# Patient Record
(demographics unavailable — no encounter records)

---

## 2024-10-11 NOTE — PHYSICAL EXAM
[None] : none [Cooperative] : cooperative [Depressed] : depressed [Anxious] : anxious [Flat] : flat [Clear] : clear [Linear/Goal Directed] : linear/goal directed [Average] : average [WNL] : within normal limits [de-identified] : dysphoric

## 2024-10-11 NOTE — HISTORY OF PRESENT ILLNESS
[FreeTextEntry1] :   The following service was provided using telehealth between writer/provider and patient. The patient was at home. The writer was at clinic. Patient was alone and consented to telehealth format. All treatment plans through and including today's date were reviewed with the patient.  Patient is here for 1 month follow-up visit via telehealth.  States she is cross tapering from Zoloft to Pristiq 25 mg.   Mood: Increase anxiety is there but states she is doing better than she was before Sleep: 5-6 hours broken. Wakes up multiple times a night.  Appetite: is having hunger pains even when she is eating. When she is eating does not feels she wants to eat. So she is eating less. Half of what ever she si eating .  Energy: getting better Concentration: getting little better    Motivation: getting little better Denies any AVH, SI or HI. Last marijuana: 2 weeks ago.  Nicotine: Smoking 6 cigarettes per day. Not ready to quit.  Patient states she started psychotherapy. States she got an appt on [facility] on [facility]. States she got into their PTSD. First intake is on is on Tuesday 10/15 [FreeTextEntry2] : H/O: depression, anxiety, PTSD, and panic attacks.  Inpatient hospitalization: denies  Past SI: Cutting started ta age 17. Last cutting was at age 21 Therapist: Palak Culver  Psychiatrist: [facility]  Mukul Gillis  Medication trials: Trintellex, Rexulti, Klonopin, Valium. Been on it for 2 year. Has not been on it for 1 year. States all those medications helped her.  Current medications: denies Firearms: denies

## 2024-10-11 NOTE — PLAN
[FreeTextEntry4] : Assessment: Patient is a 43 yo female with h/o depression, anxiety, and PTSD seen today for medication management. Patient is compliant with the medications, tolerating it well without any side effects. I-STOP was checked without any problems.  I-STOP: Patient Demographic Information (PDI)     PDI	First Name	Last Name	Birth Date	Gender	Street Address	[facility]	Zip Code [place]	02/18/1979	Female	[address]	[id number]  Prescription Information    PDI Filter:   PDI	My Rx	Current Rx	Drug Type	Rx Written	Rx Dispensed	Drug	Quantity	Days Supply	Prescriber Name	Prescriber XUAN #	Payment Method	Dispenser A	N	N	O	09/22/2023	09/22/2023	oxycodone-acetaminophen 5-325 mg tab	21	7	Caterina Holliday	[id number]	Insurance	[id number] A	N	N	O	08/24/2023	08/25/2023	oxycodone-acetaminophen 5-325 mg tab	21	7	Caterina Holliday	[id number]	Insurance	[id number] A	N	N	O	08/16/2023	08/16/2023	oxycodone-acetaminophen 5-325 mg tab	21	7	Caterina Holliday	[id number]	Insurance	[id number] A	N	N	O	07/27/2023	07/28/2023	oxycodone-acetaminophen 5-325 mg tab	21	7	Caterina Holliady	[id number]	Insurance	[id number] A	N	N	O	07/06/2023	07/06/2023	oxycodone-acetaminophen 5-325 mg tab	45	15	Deejay Sofia	[id number]	Insurance	[id number] A	N	N		07/03/2023	07/03/2023	pregabalin 50 mg capsule	60	30	Deejay Sofia	[id number]	Insurance	[id number] A	N	N	O	06/26/2023	06/26/2023	oxycodone-acetaminophen 5-325 mg tab	21	7	Diamond Sims	[id number]	Insurance	[id number] A	N	N	O	06/21/2023	06/22/2023	buprenorphine 10 mcg/hr patch	4	28	Andrez Tobar MD	[id number]	Insurance	[id number]  PLAN: Signed her FMLA continuous from 9/24-11/4 D/C Zoloft 75  Increase Pristiq 25 to 50  mg PO QD for depression and anxiety Start Hydroxyzine 10 mg PO QHS for anxiety and insomnia Continue Klonopin 0.75 mg PO PRN #45 for anxiety D/C Mirtazapine 7.5 mg PO QHS for insomnia D/C Cymbalta 120 mg PO QHS for depression, anxiety and neuropathic pain.  D/C Vistaril 25 mg PO TID PRN for anxiety, insomnia and allergic reaction.  D/C Zoloft 25 mg x 5 days and then increase it to 50 mg PO QD for depression and anxiety D/C Trintellix 10 mg PO QD for depression and anxiety. D/C Lexapro 10 mg PO QAM for depression and anxiety D/C Trazodone 50 mg PO QHS for insomnia - Discussed risks and benefits of medications including side effects of GI and sexual with SSRI. Alternative strategies including no intervention discussed with patient. Patient consents to current medications as prescribed. - Discussed with patient regarding importance of abstinence and sobriety from alcohol and drugs. Educated about relationship between worsening mood/anxiety symptoms and drug use and improvement of symptoms with abstinence.  - Discussed about unpredictable effects including cardiorespiratory collapse from the combination of illicit drugs and prescribed medications. Patient verbalized understanding. - Patient understands to contact clinic prn with concerns and agrees to call 911 or go to nearest ER if symptoms worsen. - Next appointment was made by the patient in 6-8 weeks Patient was not in any distress.

## 2024-11-05 NOTE — PLAN
[FreeTextEntry4] : Assessment: Patient is a 43 yo female with h/o depression, anxiety, and PTSD seen today for medication management. Patient is compliant with the medications, tolerating it well without any side effects. I-STOP was checked without any problems.  I-STOP: Patient Demographic Information (PDI)     PDI	First Name	Last Name	Birth Date	Gender	Street Address	[facility]	Zip Code [place]	02/18/1979	Female	[address] [facility]	[address]  Prescription Information    PDI Filter:   PDI	My Rx	Current Rx	Drug Type	Rx Written	Rx Dispensed	Drug	Quantity	Days Supply	Prescriber Name	Prescriber XUAN #	Payment Method	Dispenser A	N	N	O	09/22/2023	09/22/2023	oxycodone-acetaminophen 5-325 mg tab	21	7	Caterina Holliday	[id number]	Insurance	[id number] A	N	N	O	08/24/2023	08/25/2023	oxycodone-acetaminophen 5-325 mg tab	21	7	Caterina Holliday	[id number]	Insurance	[id number] A	N	N	O	08/16/2023	08/16/2023	oxycodone-acetaminophen 5-325 mg tab	21	7	Caterina Holliday	[id number]	Insurance	[id number] A	N	N	O	07/27/2023	07/28/2023	oxycodone-acetaminophen 5-325 mg tab	21	7	Caterina Holliday	[id number]	Insurance	[id number] A	N	N	O	07/06/2023	07/06/2023	oxycodone-acetaminophen 5-325 mg tab	45	15	Deejay Sofia	[id number]	Insurance	[id number] A	N	N		07/03/2023	07/03/2023	pregabalin 50 mg capsule	60	30	Deejay Sofia	[id number]	Insurance	[id number] A	N	N	O	06/26/2023	06/26/2023	oxycodone-acetaminophen 5-325 mg tab	21	7	Diamond Sims	[id number]	Insurance	[id number] A	N	N	O	06/21/2023	06/22/2023	buprenorphine 10 mcg/hr patch	4	28	Andrez Tobar MD	[id number]	Insurance	[id number]  PLAN: Signed her FMLA continuous from 9/24-11/4 D/C Zoloft 75  Increase Pristiq 50 to 75 mg PO QD for depression and anxiety D/C Hydroxyzine 10 mg PO QHS for anxiety and insomnia Change Klonopin 0.75 mg to Lorazepam 1.5 mg PO QD PRN for anxiety #45 Re-start Mirtazapine 7.5 mg PO QHS for insomnia D/C Cymbalta 120 mg PO QHS for depression, anxiety and neuropathic pain.  D/C Vistaril 25 mg PO TID PRN for anxiety, insomnia and allergic reaction.  D/C Zoloft 25 mg x 5 days and then increase it to 50 mg PO QD for depression and anxiety D/C Trintellix 10 mg PO QD for depression and anxiety. D/C Lexapro 10 mg PO QAM for depression and anxiety D/C Trazodone 50 mg PO QHS for insomnia - Discussed risks and benefits of medications including side effects of GI and sexual with SSRI. Alternative strategies including no intervention discussed with patient. Patient consents to current medications as prescribed. - Discussed with patient regarding importance of abstinence and sobriety from alcohol and drugs. Educated about relationship between worsening mood/anxiety symptoms and drug use and improvement of symptoms with abstinence.  - Discussed about unpredictable effects including cardiorespiratory collapse from the combination of illicit drugs and prescribed medications. Patient verbalized understanding. - Patient understands to contact clinic prn with concerns and agrees to call 911 or go to nearest ER if symptoms worsen. - Next appointment was made by the patient in 6-8 weeks Patient was not in any distress.

## 2024-11-05 NOTE — HISTORY OF PRESENT ILLNESS
[FreeTextEntry1] : The following service was provided using telehealth between writer/provider and patient. The patient was at home. The writer was at clinic. Patient was alone and consented to telehealth format. All treatment plans through and including today's date were reviewed with the patient.  Patient is here for 1 month follow-up visit via telehealth.  Last month Pristiq was increased from 25 to 50 mg. States it helped for a couple of days in the beginning but then it stopped. Increase anxiety and depressed. Last month also Hydroxyzine 10 mg was started on the patient but states that didn't help her sleep either.  States she tried a Mirtazapine 7.5 mg which did help her sleep.    Mood: anxious and depressed. Went back to work yesterday 11/4.  Sleep: With Mirtazapine went to sleep at 8:30 pm and woke up at 6am.  Appetite: is better  Energy: getting little better.  Concentration: getting little better    Motivation: getting little better Denies any AVH, SI or HI. Last marijuana: smokes 2 times a week.  Nicotine: Smoking 6-7 cigarettes per day. Not ready to quit.  Patient states she started psychotherapy. States she got an appt on [facility] help on [place]. States she got into their PTSD program.  [FreeTextEntry2] : H/O: depression, anxiety, PTSD, and panic attacks.  Inpatient hospitalization: denies  Past SI: Cutting started ta age 17. Last cutting was at age 21 Therapist: Palak Culver  Psychiatrist: [facility]  Mukul Gillis  Medication trials: Trintellex, Rexulti, Klonopin, Valium. Been on it for 2 year. Has not been on it for 1 year. States all those medications helped her.  Current medications: denies Firearms: denies

## 2024-11-05 NOTE — PHYSICAL EXAM
[None] : none [Cooperative] : cooperative [Depressed] : depressed [Anxious] : anxious [Flat] : flat [Clear] : clear [Linear/Goal Directed] : linear/goal directed [Average] : average [WNL] : within normal limits [de-identified] : dysphoric

## 2024-11-18 NOTE — PHYSICAL EXAM
[None] : none [Cooperative] : cooperative [Depressed] : depressed [Anxious] : anxious [Flat] : flat [Clear] : clear [Linear/Goal Directed] : linear/goal directed [Average] : average [WNL] : within normal limits [de-identified] : dysphoric

## 2024-11-18 NOTE — HISTORY OF PRESENT ILLNESS
[FreeTextEntry1] : The following service was provided using telehealth between writer/provider and patient. The patient was at home. The writer was at clinic. Patient was alone and consented to telehealth format. All treatment plans through and including today's date were reviewed with the patient.  Patient is here for 2 weeks ago follow-up visit via telehealth.  Last seen on Nov5, 2024. At that time Pristiq was increased from 50 to 75 mg. States it is making me shake. Mirtazapine 7.5 mg was also given and states that didn't help her sleep at all. In the past she was on Trintellex and Rexulti combo which helped the most. She didn't even use the Klonopin at that time all that often.     Mood: anxious and depressed. Went back to work yesterday 11/4.  Sleep: 4-5 hours per night broken.  Appetite: is better  Energy: decreased Concentration: decreased    Motivation: decreased Denies any AVH, SI or HI. Last marijuana: over the weekend  Nicotine: Smoking has increased from 6-7 cigarettes per day to 1/2 PPD. Not ready to quit.  Patient states she started psychotherapy. Has only had 1 session with her.

## 2024-11-18 NOTE — PLAN
[FreeTextEntry4] : Assessment: Patient is a 45 yo female with h/o depression, anxiety, and PTSD seen today for medication management. Patient is compliant with the medications, tolerating it well without any side effects. I-STOP was checked without any problems.  I-STOP: Patient Demographic Information (PDI)     PDI	First Name	Last Name	Birth Date	Gender	Street Address	[facility]	Zip Code [place]	02/18/1979	Female	[address]	[address]  Prescription Information    PDI Filter:   PDI	My Rx	Current Rx	Drug Type	Rx Written	Rx Dispensed	Drug	Quantity	Days Supply	Prescriber Name	Prescriber XUAN #	Payment Method	Dispenser A	N	N	O	09/22/2023	09/22/2023	oxycodone-acetaminophen 5-325 mg tab	21	7	Caterina Holliday	[id number]	Insurance	[id number] A	N	N	O	08/24/2023	08/25/2023	oxycodone-acetaminophen 5-325 mg tab	21	7	Caterina Holliday	[id number]	Insurance	[id number] A	N	N	O	08/16/2023	08/16/2023	oxycodone-acetaminophen 5-325 mg tab	21	7	Caterina Holliday	[id number]	Insurance	[id number] A	N	N	O	07/27/2023	07/28/2023	oxycodone-acetaminophen 5-325 mg tab	21	7	Caterina Holliday	[id number]	Insurance	[id number] A	N	N	O	07/06/2023	07/06/2023	oxycodone-acetaminophen 5-325 mg tab	45	15	Deejay Sofia	[id number]	Insurance	[id number] A	N	N		07/03/2023	07/03/2023	pregabalin 50 mg capsule	60	30	Deejay Sofia	[id number]	Insurance	[id number] A	N	N	O	06/26/2023	06/26/2023	oxycodone-acetaminophen 5-325 mg tab	21	7	Diamond Sims	[id number]	Insurance	[id number] A	N	N	O	06/21/2023	06/22/2023	buprenorphine 10 mcg/hr patch	4	28	Andrez Tobar MD	[id number]	Insurance	[id number]  PLAN: Signed her FMLA continuous from 9/24-12/2/2024 Start Trintellix 10 mg PO QD for depression and anxiety Restart the Klonopin 0.75 mg PO PRN for anxiety #45 D/C Zoloft 75  D/C Pristiq PO QD for depression and anxiety D/C Hydroxyzine 10 mg PO QHS for anxiety and insomnia D/C Mirtazapine 7.5 mg PO QHS for insomnia D/C Cymbalta 120 mg PO QHS for depression, anxiety and neuropathic pain.  D/C Vistaril 25 mg PO TID PRN for anxiety, insomnia and allergic reaction.  D/C Zoloft 25 mg x 5 days and then increase it to 50 mg PO QD for depression and anxiety D/C Lexapro 10 mg PO QAM for depression and anxiety D/C Trazodone 50 mg PO QHS for insomnia - Discussed risks and benefits of medications including side effects of GI and sexual with SSRI. Alternative strategies including no intervention discussed with patient. Patient consents to current medications as prescribed. - Discussed with patient regarding importance of abstinence and sobriety from alcohol and drugs. Educated about relationship between worsening mood/anxiety symptoms and drug use and improvement of symptoms with abstinence.  - Discussed about unpredictable effects including cardiorespiratory collapse from the combination of illicit drugs and prescribed medications. Patient verbalized understanding. - Patient understands to contact clinic prn with concerns and agrees to call 911 or go to nearest ER if symptoms worsen. - Next appointment was made by the patient in 6-8 weeks Patient was not in any distress.

## 2024-11-20 NOTE — PLAN
[Unable to complete assessment] : Unable to complete assessment [FreeTextEntry3] : See below. [FreeTextEntry4] : Pt. is aware that further assessment is needed to determine whether treatment provided by writer at [facility] is the best fit for pt.'s presenting concerns at this time. Specifically, further assessment is needed to r/o posttraumatic stress disorder, a depressive disorder, anxiety disorder, and cannabis use disorder. Pt. may benefit from 12-18 sessions of trauma-focused therapy. Pt. will attend 1-2 additional assessment sessions before admission decision and treatment recommendations are made. RTC in 1 week.

## 2024-11-20 NOTE — PSYCHOSOCIAL ASSESSMENT
[Yes, during lifetime] : Yes, during lifetime [Use within last 30 days] : use within last 30 days [No] : Patient attended school, home tutoring, or received education instruction at anytime in the past three months? No [Competitive and integrated employment] : Competitive and integrated employment [Earned income] : earned income [None] : none [Client's spouse or domestic partner] : client's spouse or domestic partner [Unknown] : Prior or current active [place]  service? Unknown [FreeTextEntry2] : Pt. described her  as kind, though she noted that his unemployment has caused tension in the relationship. Further assessment of pt.'s social support is warranted and may be discussed at f/u. [FreeTextEntry3] : Pt. 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 [FreeTextEntry1] : Pt. is a 46 y/o cisgender woman who identifies as [place]uv[place].

## 2024-11-20 NOTE — PLAN
[Unable to complete assessment] : Unable to complete assessment [FreeTextEntry3] : See below. [FreeTextEntry4] : Pt. is aware that further assessment is needed to determine whether treatment provided by writer at [facility] is the best fit for pt.'s presenting concerns at this time. Specifically, further assessment is needed to r/o posttraumatic stress disorder, a depressive disorder, anxiety disorder, and cannabis use disorder. Pt. may benefit from 12-18 sessions of trauma-focused therapy. Pt. will attend 1-2 additional assessment sessions before admission decision and treatment recommendations are made. RTC in 1 week.

## 2024-11-20 NOTE — FAMILY HISTORY
[FreeTextEntry1] : Pt. reported that her father has Alzheimer's, and that multiple other family members had dementia. Pt. reported that two of her Aunts and one cousin attempted suicide, and that she believes another Aunt  by suicide. Pt. indicated that another cousin experienced substance use disorder.

## 2024-11-20 NOTE — ADDENDUM
[FreeTextEntry1] : Pt. also completed the following self-report measures on intake: PHQ-8 = 24; indicative of severe depression symptoms ALFREDO-7 = 21; indicative of severe anxiety symptoms
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
## 2024-11-20 NOTE — PAST MEDICAL HISTORY
[FreeTextEntry1] : Pt. described a history of chronic pain associated with back herniations and rheumatoid arthritis. Pt. explained that she is prescribed Percocet and Tizanidine for pain, which she has not taken in several months. Pt. explained that she was previously prescribed at-home administration of morphine and Dilauded.

## 2024-11-20 NOTE — PHYSICAL EXAM
[Average] : average [Cooperative] : cooperative [Anxious] : anxious [Full] : full [Clear] : clear [Linear/Goal Directed] : linear/goal directed [WNL] : within normal limits [] : No [1 - Monthly or less] : 1 - Monthly or less [0 - 1 or 2] : 0 - 1 or 2 [0 - Never] : 0 - Never [0 - No] : 0 - No [2] : 2 [1] : 1 [3] : 3 [0] : 0 [FreeTextEntry1] : 1 [SchwartzScore] : 7
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
## 2024-11-20 NOTE — PLAN
[Unable to complete assessment] : Unable to complete assessment [FreeTextEntry3] : See below. [FreeTextEntry4] : Pt. is aware that further assessment is needed to determine whether treatment provided by writer at [facility] is the best fit for pt.'s presenting concerns at this time. Specifically, further assessment is needed to r/o posttraumatic stress disorder, a depressive disorder, anxiety disorder, and cannabis use disorder. Pt. may benefit from 12-18 sessions of trauma-focused therapy. Pt. will attend 1-2 additional assessment sessions before admission decision and treatment recommendations are made. RTC in 1 week.

## 2024-11-20 NOTE — REASON FOR VISIT
[Patient preference] : as per patient preference [Continuing, patient not seen in-person within last 12 months (provide details below)] : Telehealth services are continuing, patient not seen in-person within last 12 months.  [Telehealth (audio & video) - Individual/Group] : This visit was provided via telehealth using real-time 2-way audio visual technology. [Other Location: e.g. Home (Enter Location, City,State)___] : The provider was located at [unfilled]. [Home] : The patient, [unfilled], was located at home, [unfilled], at the time of the visit. [Patient's space is appropriate for telehealth and maintains privacy/confidentiality.] : Patient's space is appropriate for telehealth and maintains privacy/confidentiality. [Verbal consent obtained from patient/other participant(s)] : Verbal consent for telehealth/telephonic services obtained from patient/other participant(s) [Other:___] : [unfilled] [Patient] : Patient [FreeTextEntry4] : 10:00AM [FreeTextEntry3] : [id number]@[employer] [FreeTextEntry5] : English [FreeTextEntry6] : [place] [FreeTextEntry7] : She/her/hers [FreeTextEntry2] : "PTSD, panic disorder, depression, suicidal ideation" [FreeTextEntry1] : "PTSD"
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
## 2024-11-20 NOTE — REASON FOR VISIT
[Patient preference] : as per patient preference [Continuing, patient not seen in-person within last 12 months (provide details below)] : Telehealth services are continuing, patient not seen in-person within last 12 months.  [Telehealth (audio & video) - Individual/Group] : This visit was provided via telehealth using real-time 2-way audio visual technology. [Other Location: e.g. Home (Enter Location, City,State)___] : The provider was located at [unfilled]. [Home] : The patient, [unfilled], was located at home, [unfilled], at the time of the visit. [Patient's space is appropriate for telehealth and maintains privacy/confidentiality.] : Patient's space is appropriate for telehealth and maintains privacy/confidentiality. [Verbal consent obtained from patient/other participant(s)] : Verbal consent for telehealth/telephonic services obtained from patient/other participant(s) [Other:___] : [unfilled] [Patient] : Patient [FreeTextEntry4] : 10:00AM [FreeTextEntry3] : [id number]@[employer] [FreeTextEntry5] : English [FreeTextEntry6] : [place] [FreeTextEntry7] : She/her/hers [FreeTextEntry2] : "PTSD, panic disorder, depression, suicidal ideation" [FreeTextEntry1] : "PTSD"
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
## 2024-11-20 NOTE — DISCUSSION/SUMMARY
[Low acute suicide risk] : Low acute suicide risk [Yes] : Safety Plan completed/updated (for individuals at risk): Yes [FreeTextEntry1] : As described further above, pt. indicated that she last experienced thoughts of killing herself on the morning of this appointment. Pt. endorsed past-month thoughts that she would be better off dead and has considered methods by which she might kill herself (e.g., driving off of a bridge). Pt. reported that she last experienced intent to kill herself six months prior to this appt. which she attributes to Lyrica that she was prescribed at the time and has since discontinued. Pt. reported that she has never made a specific plan to kill herself and has never attempted suicide. Pt. also has history of non-suicidal self-injurious behavior. Specifically, pt. engaged in non-suicidal cutting in her 20s and in the past-month has engaged in self-biting that has not broken skin. Pt. denied lifetime history homicidal ideation and aggressive behavior. Pt.'s heightened anxiety symptoms, history of trauma, family history of suicide, ongoing active suicidal ideation, and chronic pain are risk factors, though her stable employment, stable residence, engagement in treatment, sense of responsibility to friends and family, and willingness to safety plan are protective factors. Given these protective factors and that pt. was denying intent and plan to kill herself at the time of this appointment, pt. is deemed to be low acute risk for suicide but elevated chronic risk for suicide and self-injurious behavior. Pt. is deemed to be low acute and chronic risk for homicide and aggression. Taken together, hospitalization was not warranted at this time and risk will be assessed at f/u.
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
## 2024-11-20 NOTE — REASON FOR VISIT
[Patient preference] : as per patient preference [Continuing, patient not seen in-person within last 12 months (provide details below)] : Telehealth services are continuing, patient not seen in-person within last 12 months.  [Telehealth (audio & video) - Individual/Group] : This visit was provided via telehealth using real-time 2-way audio visual technology. [Other Location: e.g. Home (Enter Location, City,State)___] : The provider was located at [unfilled]. [Home] : The patient, [unfilled], was located at home, [unfilled], at the time of the visit. [Patient's space is appropriate for telehealth and maintains privacy/confidentiality.] : Patient's space is appropriate for telehealth and maintains privacy/confidentiality. [Verbal consent obtained from patient/other participant(s)] : Verbal consent for telehealth/telephonic services obtained from patient/other participant(s) [Other:___] : [unfilled] [Patient] : Patient [FreeTextEntry4] : 10:00AM [FreeTextEntry3] : [id number] [FreeTextEntry5] : English [FreeTextEntry6] : [place] [FreeTextEntry7] : She/her/hers [FreeTextEntry2] : "PTSD, panic disorder, depression, suicidal ideation" [FreeTextEntry1] : "PTSD"
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
## 2024-11-25 NOTE — REASON FOR VISIT
[Patient preference] : as per patient preference [Continuing, patient not seen in-person within last 12 months (provide details below)] : Telehealth services are continuing, patient not seen in-person within last 12 months.  [Telehealth (audio & video) - Individual/Group] : This visit was provided via telehealth using real-time 2-way audio visual technology. [Other Location: e.g. Home (Enter Location, City,State)___] : The patient, [unfilled], was located at [unfilled] at the time of the visit. [Patient's space is appropriate for telehealth and maintains privacy/confidentiality.] : Patient's space is appropriate for telehealth and maintains privacy/confidentiality. [Verbal consent obtained from patient/other participant(s)] : Verbal consent for telehealth/telephonic services obtained from patient/other participant(s) [Patient] : Patient [FreeTextEntry4] : 12:00PM [FreeTextEntry5] : 12:38PM [FreeTextEntry1] : Trauma

## 2024-11-25 NOTE — RISK ASSESSMENT
[Low acute suicide risk] : Low acute suicide risk [Yes] : Safety Plan completed/updated (for individuals at risk): Yes [FreeTextEntry8] : Pt. reported that in the time since her intake with writer (11/11/24) she has continued to experience thoughts that she would be better off dead and of killing herself by crashing her car. Pt. reported that she has not experienced plan or intent to kill herself since 11/11/24. On intake, pt. indicated that she feels very confident that she would not act on thoughts of suicide as she recognizes the negative impact that her suicide would have on friends and family. Pt. indicated that she has never made a plan or an attempt to kill herself. On intake pt. also noted that approximately six months prior she experienced intent to kill herself, which she attributed to Lyrica that she was prescribed at the time. Pt. reported that she "locked myself in a room" to keep herself safe. Given pt.'s recent thoughts of suicide with methods, writer and pt. completed a safety plan together during pt.'s intake appt. See below for pt.'s safety plan. Pt. was engaged and receptive to safety planning. Pt. indicated during this appt. that she has shared her safety plan with friends and family, and has used it as a coping plan in the last week.  Safety plan Step 1. Warning signs Flashbacks about trauma Shaking and moving a lot more Intrusive thoughts of hurting myself  Step 2: Internal coping strategies Going for a walk in a not so populated place (e.g., beach) Going to sleep  Step 3: People that provide distraction Visiting my niece Calling my mom  Step 4: People whom I can ask for help: My cousin (Eliza)  Step 5: Professionals who I can contact during a crisis Palak Bailey, Psychologist [facility] [address]: [facility]  Step 6: Things I can do to make my environment safer N/A (not thinking of any specific methods to hurt myself that involve items at home)  Lastly, it is also of note that on intake pt. endorsed a history of non-suicidal self-injury, noting that she engaged in cutting behavior in her 20s. Pt. reported that she continues to bite herself "randomly" though she does not break skin when she engages in biting behavior.  Pt.'s heightened anxiety symptoms, history of trauma, family history of suicide, ongoing active suicidal ideation, and chronic pain are risk factors, though her stable employment, stable residence, engagement in treatment, sense of responsibility to friends and family, and willingness to safety plan are protective factors. Given these protective factors and that pt. was denying intent and plan to kill herself at the time of this appointment, pt. is deemed to be low acute risk for suicide but elevated chronic risk for suicide and self-injurious behavior. Taken together, hospitalization was not warranted at this time and risk will be assessed at f/u.   [FreeTextEntry7] : Pt. denied lifetime history homicidal ideation and aggressive behavior on intake. Pt. did not endorse homicidal ideation or aggression during this appt. Given this and pt.'s protective factors, described above, pt. is deemed to be low risk for homicide and aggression at this time.

## 2024-12-03 NOTE — PLAN
[Psychoeducation] : Psychoeducation  [Other: ____] : [unfilled] [de-identified] : Pt. reported that for several reasons she decided to quit her job in the last week. Pt. explained that she also  from her  due to ongoing conflict related to his employment. Pt. indicated that she would be staying with her mother while she considers how to move forward. Pt. explained that she wanted to proceed with the process of assessment and treatment planning in spite of recent significant stressors, as she feels that this process is essential in making progress towards improving her ability to cope with stressors. Given this, session focused on completing review of Life Events Checklist (LEC) and beginning PCL-5. Although assessment of PTSD symptoms is not yet complete, initial screening suggests that pt has clinically significant symptoms of PTSD that would likely benefit from trauma-focused treatment. Given this, writer recommended that pt. be admitted to [facility]. Assessment and selection of tx modality to be finished at f/u.  Normal vision: sees adequately in most situations; can see medication labels, newsprint

## 2024-12-03 NOTE — REASON FOR VISIT
[Patient preference] : as per patient preference [Continuing, patient not seen in-person within last 12 months (provide details below)] : Telehealth services are continuing, patient not seen in-person within last 12 months.  [Telehealth (audio & video) - Individual/Group] : This visit was provided via telehealth using real-time 2-way audio visual technology. [Other Location: e.g. Home (Enter Location, City,State)___] : The patient, [unfilled], was located at [unfilled] at the time of the visit. [Patient's space is appropriate for telehealth and maintains privacy/confidentiality.] : Patient's space is appropriate for telehealth and maintains privacy/confidentiality. [Verbal consent obtained from patient/other participant(s)] : Verbal consent for telehealth/telephonic services obtained from patient/other participant(s) [Patient] : Patient [FreeTextEntry4] : 3:00PM [FreeTextEntry5] : 3:53PM [FreeTextEntry1] : Trauma

## 2024-12-03 NOTE — RISK ASSESSMENT
[Low acute suicide risk] : Low acute suicide risk [Yes] : Safety Plan completed/updated (for individuals at risk): Yes [FreeTextEntry8] : Pt. reported that in the time since her intake with writer (11/11/24) she has continued to experience thoughts that she would be better off dead and of killing herself by crashing her car. Pt. reported that she has not experienced plan or intent to kill herself in the last week. On intake, pt. indicated that she feels very confident that she would not act on thoughts of suicide as she recognizes the negative impact that her suicide would have on friends and family. Pt. indicated that she has never made a plan or an attempt to kill herself. On intake pt. also noted that approximately six months prior she experienced intent to kill herself, which she attributed to Lyrica that she was prescribed at the time. Pt. reported that she "locked myself in a room" to keep herself safe. Given pt.'s recent thoughts of suicide with methods, writer and pt. completed a safety plan together during pt.'s intake appt. See below for pt.'s safety plan. Pt. was engaged in and receptive to safety planning. Pt. indicated during this appt. that she has shared her safety plan with friends and family, and has used it as a coping plan in the last week.  Safety plan Step 1. Warning signs Flashbacks about trauma Shaking and moving a lot more Intrusive thoughts of hurting myself  Step 2: Internal coping strategies Going for a walk in a not so populated place (e.g., beach) Going to sleep  Step 3: People that provide distraction Visiting my niece Calling my mom  Step 4: People whom I can ask for help: My cousin (Eliza)  Step 5: Professionals who I can contact during a crisis Palak Bailey, Psychologist [facility] [address]: [facility]  Step 6: Things I can do to make my environment safer N/A (not thinking of any specific methods to hurt myself that involve items at home)  Lastly, it is also of note that on intake pt. endorsed a history of non-suicidal self-injury, noting that she engaged in cutting behavior in her 20s. Pt. reported that she continues to bite herself "randomly" though she does not break skin when she engages in biting behavior.  Pt.'s heightened anxiety symptoms, history of trauma, family history of suicide, ongoing active suicidal ideation, and chronic pain are risk factors, though stable residence, engagement in treatment, sense of responsibility to friends and family, and willingness to safety plan are protective factors. Given these protective factors and that pt. was denying intent and plan to kill herself at the time of this appointment, pt. is deemed to be low acute risk for suicide but elevated chronic risk for suicide and self-injurious behavior. Taken together, hospitalization was not warranted at this time and risk will be assessed at f/u.   [FreeTextEntry7] : Pt. denied lifetime history homicidal ideation and aggressive behavior on intake. Pt. did not endorse homicidal ideation or aggression during this appt. Given this and pt.'s protective factors, described above, pt. is deemed to be low risk for homicide and aggression at this time.

## 2024-12-05 NOTE — REASON FOR VISIT
[Patient preference] : as per patient preference [Continuing, patient not seen in-person within last 12 months (provide details below)] : Telehealth services are continuing, patient not seen in-person within last 12 months.  [Telehealth (audio & video) - Individual/Group] : This visit was provided via telehealth using real-time 2-way audio visual technology. [Other Location: e.g. Home (Enter Location, City,State)___] : The provider was located at [unfilled]. [Home] : The patient, [unfilled], was located at home, [unfilled], at the time of the visit. [Patient's space is appropriate for telehealth and maintains privacy/confidentiality.] : Patient's space is appropriate for telehealth and maintains privacy/confidentiality. [Verbal consent obtained from patient/other participant(s)] : Verbal consent for telehealth/telephonic services obtained from patient/other participant(s) [FreeTextEntry4] : 3:00PM [FreeTextEntry5] : 3:53PM [Patient] : Patient [FreeTextEntry1] : Trauma

## 2024-12-05 NOTE — ADDENDUM
[FreeTextEntry1] : Pt. completed the following self-report measure of PTSD symptoms on this date: PCL-5 = 55; indicative of probable PTSD
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
## 2024-12-05 NOTE — RISK ASSESSMENT
[FreeTextEntry8] : Pt. reported that in the time since her intake with writer (11/11/24) she has continued to experience thoughts that she would be better off dead and of killing herself by crashing her car. Pt. reported that she has not experienced plan or intent to kill herself in the last week. On intake, pt. indicated that she feels very confident that she would not act on thoughts of suicide as she recognizes the negative impact that her suicide would have on friends and family. Pt. indicated that she has never made a plan or an attempt to kill herself. On intake pt. also noted that approximately six months prior she experienced intent to kill herself, which she attributed to Lyrica that she was prescribed at the time. Pt. reported that she "locked myself in a room" to keep herself safe. Given pt.'s recent thoughts of suicide with methods, writer and pt. completed a safety plan together during pt.'s intake appt. See below for pt.'s safety plan. Pt. was engaged in and receptive to safety planning. Pt. indicated during this appt. that she has shared her safety plan with friends and family, and has used it as a coping plan in the last week.  Safety plan Step 1. Warning signs Flashbacks about trauma Shaking and moving a lot more Intrusive thoughts of hurting myself  Step 2: Internal coping strategies Going for a walk in a not so populated place (e.g., beach) Going to sleep  Step 3: People that provide distraction Visiting my niece Calling my mom  Step 4: People whom I can ask for help: My cousin (Eliza)  Step 5: Professionals who I can contact during a crisis Palak Bailey, Psychologist [facility] [address]: [facility]  Step 6: Things I can do to make my environment safer N/A (not thinking of any specific methods to hurt myself that involve items at home)  Lastly, it is also of note that on intake pt. endorsed a history of non-suicidal self-injury, noting that she engaged in cutting behavior in her 20s. Pt. reported that she continues to bite herself "randomly" though she does not break skin when she engages in biting behavior.  Pt.'s heightened anxiety symptoms, history of trauma, family history of suicide, ongoing active suicidal ideation, and chronic pain are risk factors, though stable residence, engagement in treatment, sense of responsibility to friends and family, and willingness to safety plan are protective factors. Given these protective factors and that pt. was denying intent and plan to kill herself at the time of this appointment, pt. is deemed to be low acute risk for suicide but elevated chronic risk for suicide and self-injurious behavior. Taken together, hospitalization was not warranted at this time and risk will be assessed at f/u.   [FreeTextEntry7] : Pt. denied lifetime history homicidal ideation and aggressive behavior on intake. Pt. did not endorse homicidal ideation or aggression during this appt. Given this and pt.'s protective factors, described above, pt. is deemed to be low risk for homicide and aggression at this time. [Low acute suicide risk] : Low acute suicide risk [Yes] : Safety Plan completed/updated (for individuals at risk): Yes

## 2024-12-16 NOTE — RISK ASSESSMENT
[FreeTextEntry8] : Pt. reported that in the time since her intake with writer (11/11/24) she has continued to experience thoughts that she would be better off dead and of killing herself by crashing her car. Pt. reported that she has not experienced plan or intent to kill herself in the last week. On intake, pt. indicated that she feels very confident that she would not act on thoughts of suicide as she recognizes the negative impact that her suicide would have on friends and family. Pt. indicated that she has never made a plan or an attempt to kill herself. On intake pt. also noted that approximately six months prior she experienced intent to kill herself, which she attributed to Lyrica that she was prescribed at the time. Pt. reported that she "locked myself in a room" to keep herself safe. Given pt.'s recent thoughts of suicide with methods, writer and pt. completed a safety plan together during pt.'s intake appt. See below for pt.'s safety plan. Pt. was engaged in and receptive to safety planning. Pt. indicated during this appt. that she has shared her safety plan with friends and family, and has used it as a coping plan in the last week.  Safety plan Step 1. Warning signs Flashbacks about trauma Shaking and moving a lot more Intrusive thoughts of hurting myself  Step 2: Internal coping strategies Going for a walk in a not so populated place (e.g., beach) Going to sleep  Step 3: People that provide distraction Visiting my niece Calling my mom  Step 4: People whom I can ask for help: My cousin (Eliza)  Step 5: Professionals who I can contact during a crisis Palak Bailey, Psychologist [facility] [address]: [facility]  Step 6: Things I can do to make my environment safer N/A (not thinking of any specific methods to hurt myself that involve items at home)  Lastly, it is also of note that on intake pt. endorsed a history of non-suicidal self-injury, noting that she engaged in cutting behavior in her 20s. Pt. reported that she continues to bite herself "randomly" though she does not break skin when she engages in biting behavior.  Pt.'s heightened anxiety symptoms, history of trauma, family history of suicide, ongoing active suicidal ideation, and chronic pain are risk factors, though stable residence, engagement in treatment, sense of responsibility to friends and family, and willingness to safety plan are protective factors. Given these protective factors and that pt. was denying intent and plan to kill herself at the time of this appointment, pt. is deemed to be low acute risk for suicide but elevated chronic risk for suicide and self-injurious behavior. Taken together, hospitalization was not warranted at this time and risk will be assessed at f/u.   [FreeTextEntry7] : Pt. denied lifetime history homicidal ideation and aggressive behavior on intake. Pt. did not endorse homicidal ideation or aggression during this appt. Given this and pt.'s protective factors, described above, pt. is deemed to be low risk for homicide and aggression at this time. [Low acute suicide risk] : Low acute suicide risk [Yes] : Safety Plan completed/updated (for individuals at risk): Yes

## 2024-12-16 NOTE — REASON FOR VISIT
[Patient preference] : as per patient preference [Continuing, patient not seen in-person within last 12 months (provide details below)] : Telehealth services are continuing, patient not seen in-person within last 12 months.  [Telehealth (audio & video) - Individual/Group] : This visit was provided via telehealth using real-time 2-way audio visual technology. [Other Location: e.g. Home (Enter Location, City,State)___] : The patient, [unfilled], was located at [unfilled] at the time of the visit. [Patient's space is appropriate for telehealth and maintains privacy/confidentiality.] : Patient's space is appropriate for telehealth and maintains privacy/confidentiality. [Verbal consent obtained from patient/other participant(s)] : Verbal consent for telehealth/telephonic services obtained from patient/other participant(s) [FreeTextEntry4] : 12:00PM [FreeTextEntry5] : 12:50PM [Patient] : Patient [FreeTextEntry1] : PTSD symptoms

## 2024-12-16 NOTE — PLAN
[FreeTextEntry2] : Writer and pt. have identified the following tx goals thus far: Problem 1: PTSD symptoms Goal 1: Reduce PTSD symptoms (hypervigilance, intrusive thoughts, avoidance) by 30%, as measured by PCL-5 Goal 2: Learn cognitive restructuring tools as a means to reduction of PTSD symptoms [Cognitive and/or Behavior Therapy] : Cognitive and/or Behavior Therapy  [Other: ____] : [unfilled] [de-identified] : Writer and pt. continued assessment and treatment planning. Specifically, writer and pt. reviewed pt.'s current substance use and discussed how pt. can schedule her use around CPT sessions. For example, pt. agreed to: (1) not use cannabis before scheduled sessions and abstain from cannabis use for one hour after sessions, (2) not use cannabis for one hour before and after homework completion. Pt. noted that she would follow this plan for Klonapin use as well, and expressed understanding that following this plan will facilitate effectiveness of CPT. Pt. also agreed to complete DAVID so that writer could communicate with pt.'s supportive community therapist about tx planning. Lastly, writer and pt. devised tx goals.

## 2024-12-20 NOTE — PHYSICAL EXAM
[None] : none [Cooperative] : cooperative [Depressed] : depressed [Anxious] : anxious [Flat] : flat [Clear] : clear [Linear/Goal Directed] : linear/goal directed [Average] : average [WNL] : within normal limits [de-identified] : dysphoric

## 2024-12-20 NOTE — HISTORY OF PRESENT ILLNESS
[FreeTextEntry1] : The following service was provided using telehealth between writer/provider and patient. The patient was at home. The writer was at clinic. Patient was alone and consented to telehealth format. All treatment plans through and including today's date were reviewed with the patient.  Patient is here for 1 month follow-up visit via telehealth.  Last month Rexulti 0.5 mg and Trintellex 10 mg was started on the patient. and Klonopin was icnreased from 0.5 to 0.75 mg. States she is doing better but anxiety is there.       Mood: anxious and depressed. Feles it is better than before. Does not use the Klonopin that much. Not asking fro a refill this month.  Increase panic attacks.  Sleep: 2-5 hours per night broken.  Appetite: eating 1/2 a meal  Energy: decreased Concentration: decreased    Motivation: decreased Denies any AVH, SI or HI. Last marijuana: 3 days ago.  Nicotine: Smoking has decreased form 10 to 7 cigarettes per day. Not ready to quit.  Patient states she started psychotherapy. Has only had 1 session with her.

## 2024-12-20 NOTE — PLAN
[FreeTextEntry4] : Assessment: Patient is a 45 yo female with h/o depression, anxiety, and PTSD seen today for medication management. Patient is compliant with the medications, tolerating it well without any side effects. I-STOP was checked without any problems.  I-STOP: Patient Demographic Information (PDI)     PDI	First Name	Last Name	Birth Date	Gender	Street Address	[facility]	Zip Code [place]	02/18/1979	Female	[address] [facility]	[id number]  Prescription Information    PDI Filter:   PDI	My Rx	Current Rx	Drug Type	Rx Written	Rx Dispensed	Drug	Quantity	Days Supply	Prescriber Name	Prescriber XUAN #	Payment Method	Dispenser A	N	N	O	09/22/2023	09/22/2023	oxycodone-acetaminophen 5-325 mg tab	21	7	Caterina Holliday	[id number]	Insurance	[id number] A	N	N	O	08/24/2023	08/25/2023	oxycodone-acetaminophen 5-325 mg tab	21	7	Caterina Holliday	[id number]	Insurance	[id number] A	N	N	O	08/16/2023	08/16/2023	oxycodone-acetaminophen 5-325 mg tab	21	7	Caterina Holliday	[id number]	Insurance	[id number] A	N	N	O	07/27/2023	07/28/2023	oxycodone-acetaminophen 5-325 mg tab	21	7	Caterina Holliday	[id number]	Insurance	[id number] A	N	N	O	07/06/2023	07/06/2023	oxycodone-acetaminophen 5-325 mg tab	45	15	Deejay Sofia	[id number]	Insurance	[id number] A	N	N		07/03/2023	07/03/2023	pregabalin 50 mg capsule	60	30	Deejay Sofia	[id number]	Insurance	[id number] A	N	N	O	06/26/2023	06/26/2023	oxycodone-acetaminophen 5-325 mg tab	21	7	Diamond Sims	[id number]	Insurance	[id number] A	N	N	O	06/21/2023	06/22/2023	buprenorphine 10 mcg/hr patch	4	28	Andrez Tobar MD	[id number]	Insurance	[id number]  PLAN: Increase Rexulti from 0.5 to 1 mg PO QD for mood disorder Increase Trintellix 10 to 20 mg PO QD for depression and anxiety Continue the Klonopin 0.75 mg PO PRN for anxiety #45 D/C Zoloft 75  D/C Pristiq PO QD for depression and anxiety D/C Hydroxyzine 10 mg PO QHS for anxiety and insomnia D/C Mirtazapine 7.5 mg PO QHS for insomnia D/C Cymbalta 120 mg PO QHS for depression, anxiety and neuropathic pain.  D/C Vistaril 25 mg PO TID PRN for anxiety, insomnia and allergic reaction.  D/C Zoloft 25 mg x 5 days and then increase it to 50 mg PO QD for depression and anxiety D/C Lexapro 10 mg PO QAM for depression and anxiety D/C Trazodone 50 mg PO QHS for insomnia - Discussed risks and benefits of medications including side effects of GI and sexual with SSRI. Alternative strategies including no intervention discussed with patient. Patient consents to current medications as prescribed. - Discussed with patient regarding importance of abstinence and sobriety from alcohol and drugs. Educated about relationship between worsening mood/anxiety symptoms and drug use and improvement of symptoms with abstinence.  - Discussed about unpredictable effects including cardiorespiratory collapse from the combination of illicit drugs and prescribed medications. Patient verbalized understanding. - Patient understands to contact clinic prn with concerns and agrees to call 911 or go to nearest ER if symptoms worsen. - Next appointment was made by the patient in 2 months Patient was not in any distress.

## 2024-12-20 NOTE — PLAN
[FreeTextEntry2] : Writer and pt. have identified the following tx goals thus far: Problem 1: PTSD symptoms Goal 1: Reduce PTSD symptoms (hypervigilance, intrusive thoughts, avoidance) by 30%, as measured by PCL-5 Goal 2: Learn cognitive restructuring tools as a means to reduction of PTSD symptoms [Cognitive Processing Therapy] : Cognitive Processing Therapy  [de-identified] : Writer and pt. began Cognitive Processing Therapy (CPT). Pt. confirmed that she had not smoked cannabis or used Klonapin prior to this appointment, which writer reinforced. Santiago provided psychoeducation about PTSD symptoms, the role of avoidance in maintaining PTSD symptoms, cognitive theory, and stuck points. Pt. was receptive to psychoeducation and engaged throughout session. Writer provided overview of structure of CPT and assigned impact statement as homework. [Recommended Frequency of Visits: ____] : Recommended frequency of visits: [unfilled] [Return in ____ week(s)] : Return in [unfilled] week(s)

## 2024-12-20 NOTE — RISK ASSESSMENT
[FreeTextEntry8] : Pt. did not endorse SI during this appt. During pt.'s prior appt. with writer (12/13/24), pt. reported that in the time since her intake with writer (11/11/24) she has continued to experience thoughts that she would be better off dead and of killing herself by crashing her car, but that she has not experienced plan or intent to kill herself. On intake, pt. indicated that she feels very confident that she would not act on thoughts of suicide as she recognizes the negative impact that her suicide would have on friends and family. Pt. indicated that she has never made a plan or an attempt to kill herself. On intake pt. also noted that approximately six months prior she experienced intent to kill herself, which she attributed to Lyrica that she was prescribed at the time. Pt. reported that she "locked myself in a room" to keep herself safe. Given pt.'s recent thoughts of suicide with methods, writer and pt. completed a safety plan together during pt.'s intake appt. See below for pt.'s safety plan. Pt. was engaged in and receptive to safety planning.   Safety plan Step 1. Warning signs Flashbacks about trauma Shaking and moving a lot more Intrusive thoughts of hurting myself  Step 2: Internal coping strategies Going for a walk in a not so populated place (e.g., beach) Going to sleep  Step 3: People that provide distraction Visiting my niece Calling my mom  Step 4: People whom I can ask for help: My cousin (Eliza)  Step 5: Professionals who I can contact during a crisis Palak Bailey, Psychologist [facility] [address]: [facility]  Step 6: Things I can do to make my environment safer N/A (not thinking of any specific methods to hurt myself that involve items at home)  Lastly, it is also of note that on intake pt. endorsed a history of non-suicidal self-injury, noting that she engaged in cutting behavior in her 20s. Pt. reported that she continues to bite herself "randomly" though she does not break skin when she engages in biting behavior.  Pt.'s heightened anxiety symptoms, history of trauma, family history of suicide, ongoing active suicidal ideation, and chronic pain are risk factors, though stable residence, engagement in treatment, sense of responsibility to friends and family, and willingness to safety plan are protective factors. Given these protective factors and that pt. was denying intent and plan to kill herself at the time of this appointment, pt. is deemed to be low acute risk for suicide but elevated chronic risk for suicide and self-injurious behavior. Taken together, hospitalization was not warranted at this time and risk will be assessed at f/u.   [FreeTextEntry7] : Pt. denied lifetime history homicidal ideation and aggressive behavior on intake. Pt. did not endorse homicidal ideation or aggression during this appt. Given this and pt.'s protective factors, described above, pt. is deemed to be low risk for homicide and aggression at this time. [Low acute suicide risk] : Low acute suicide risk [Yes] : Safety Plan completed/updated (for individuals at risk): Yes

## 2024-12-20 NOTE — REASON FOR VISIT
[Patient preference] : as per patient preference [Continuing, patient not seen in-person within last 12 months (provide details below)] : Telehealth services are continuing, patient not seen in-person within last 12 months.  [Telehealth (audio & video) - Individual/Group] : This visit was provided via telehealth using real-time 2-way audio visual technology. [Other Location: e.g. Home (Enter Location, City,State)___] : The provider was located at [unfilled]. [Home] : The patient, [unfilled], was located at home, [unfilled], at the time of the visit. [Patient's space is appropriate for telehealth and maintains privacy/confidentiality.] : Patient's space is appropriate for telehealth and maintains privacy/confidentiality. [Verbal consent obtained from patient/other participant(s)] : Verbal consent for telehealth/telephonic services obtained from patient/other participant(s) [FreeTextEntry4] : 10:05AM [FreeTextEntry5] : 10:52AM [Patient] : Patient [FreeTextEntry1] : PTSD symptoms

## 2025-01-02 NOTE — RISK ASSESSMENT
[FreeTextEntry8] : During this appt., pt. reported that she has experienced past-week thoughts that she would be better off dead. Pt. explained that she has not experienced thoughts of killing herself in the last week, and was not experiencing thoughts of killing herself at the time of this appt. During a prior appt. with writer (12/13/24), pt. reported that in the time since her intake with \writer (11/11/24) she has continued to experience thoughts that she would be better off dead and of killing herself by crashing her car, but that she has not experienced plan or intent to kill herself. On intake, pt. indicated that she feels very confident that she would not act on thoughts of suicide as she recognizes the negative impact that her suicide would have on friends and family. Pt. indicated that she has never made a plan or an attempt to kill herself. On intake pt. also noted that approximately six months prior she experienced intent to kill herself, which she attributed to Lyrica that she was prescribed at the time. Pt. reported that she "locked myself in a room" to keep herself safe. Given pt.'s recent thoughts of suicide with methods, writer and pt. completed a safety plan together during pt.'s intake appt. See below for pt.'s safety plan. Pt. was engaged in and receptive to safety planning.   Safety plan Step 1. Warning signs Flashbacks about trauma Shaking and moving a lot more Intrusive thoughts of hurting myself  Step 2: Internal coping strategies Going for a walk in a not so populated place (e.g., beach) Going to sleep  Step 3: People that provide distraction Visiting my niece Calling my mom  Step 4: People whom I can ask for help: My cousin (Eliza)  Step 5: Professionals who I can contact during a crisis Palak Bailey, Psychologist [facility] [address]: [facility]  Step 6: Things I can do to make my environment safer N/A (not thinking of any specific methods to hurt myself that involve items at home)  Lastly, it is also of note that on intake pt. endorsed a history of non-suicidal self-injury, noting that she engaged in cutting behavior in her 20s. Pt. reported that she continues to bite herself "randomly" though she does not break skin when she engages in biting behavior.  Pt.'s heightened anxiety symptoms, history of trauma, family history of suicide, ongoing active suicidal ideation, and chronic pain are risk factors, though stable residence, engagement in treatment, sense of responsibility to friends and family, and willingness to safety plan are protective factors. Given these protective factors and that pt. was denying intent and plan to kill herself at the time of this appointment, pt. is deemed to be low acute risk for suicide but elevated chronic risk for suicide and self-injurious behavior. Taken together, hospitalization was not warranted at this time and risk will be assessed at f/u.   [FreeTextEntry7] : Pt. denied lifetime history homicidal ideation and aggressive behavior on intake. Pt. did not endorse homicidal ideation or aggression during this appt. Given this and pt.'s protective factors, described above, pt. is deemed to be low risk for homicide and aggression at this time. [Low acute suicide risk] : Low acute suicide risk [Yes] : Safety Plan completed/updated (for individuals at risk): Yes

## 2025-01-02 NOTE — REASON FOR VISIT
[Patient preference] : as per patient preference [Continuing, patient not seen in-person within last 12 months (provide details below)] : Telehealth services are continuing, patient not seen in-person within last 12 months.  [Telehealth (audio & video) - Individual/Group] : This visit was provided via telehealth using real-time 2-way audio visual technology. [Other Location: e.g. Home (Enter Location, City,State)___] : The patient, [unfilled], was located at [unfilled] at the time of the visit. [Patient's space is appropriate for telehealth and maintains privacy/confidentiality.] : Patient's space is appropriate for telehealth and maintains privacy/confidentiality. [Verbal consent obtained from patient/other participant(s)] : Verbal consent for telehealth/telephonic services obtained from patient/other participant(s) [FreeTextEntry4] : 3:00PM [FreeTextEntry5] : 3:50PM [Patient] : Patient [FreeTextEntry1] : PTSD symptoms

## 2025-01-02 NOTE — PLAN
[FreeTextEntry2] : Writer and pt. have identified the following tx goals thus far: Problem 1: PTSD symptoms Goal 1: Reduce PTSD symptoms (hypervigilance, intrusive thoughts, avoidance) by 30%, as measured by PCL-5 Goal 2: Learn cognitive restructuring tools as a means to reduction of PTSD symptoms [Cognitive Processing Therapy] : Cognitive Processing Therapy  [de-identified] : Writer and pt. began Cognitive Processing Therapy (CPT). Pt. confirmed that she had not smoked cannabis, though she did use prescribed Klonapin prior to this appointment. Pt. noted that she was also able to wait almost an hour before smoking cannabis after her last appointment, and that she was able to complete her impact statement without either substance. Writer and pt. reviewed pt.'s completed impact statement and begin to collaboratively devise a stuck point list. As homework, pt. agreed to continue to add to her stuck point log and review stuck point help sheet. [Recommended Frequency of Visits: ____] : Recommended frequency of visits: [unfilled] [Return in ____ week(s)] : Return in [unfilled] week(s)

## 2025-01-12 NOTE — PLAN
[FreeTextEntry2] : Writer and pt. have identified the following tx goals thus far: Problem 1: PTSD symptoms Goal 1: Reduce PTSD symptoms (hypervigilance, intrusive thoughts, avoidance) by 30%, as measured by PCL-5 Goal 2: Learn cognitive restructuring tools as a means to reduction of PTSD symptoms [Cognitive Processing Therapy] : Cognitive Processing Therapy  [de-identified] : Writer and pt. continued Cognitive Processing Therapy (CPT). Pt. added stuck points to her stuck point log, which writer reinforced. Writer and pt. reviewed pt.'s added stuck points. Writer introduced ABC Worksheets as a tool for better understanding the relation between stuck points and emotions. Pt. was receptive. Writer and pt. practiced use of ABC Worksheets in session with an assimilated stuck point. Pt. was receptive to Socratic dialogue. As homework, pt. agreed to complete one ABC Worksheet/day. [Recommended Frequency of Visits: ____] : Recommended frequency of visits: [unfilled] [Return in ____ week(s)] : Return in [unfilled] week(s)

## 2025-01-12 NOTE — ADDENDUM
[FreeTextEntry1] : Pt. completed the following self-report measure of PTSD symptoms at baseline: PCL-5 = 55; indicative of probable PTSD

## 2025-01-12 NOTE — RISK ASSESSMENT
[FreeTextEntry8] : Pt. did not endorse SI during this appt. During pt,'s prior appt. on 12/31/24, pt. reported that she experienced past-week thoughts that she would be better off dead. Pt. explained that she had not experienced thoughts of killing herself in the last week, and was not experiencing thoughts of killing herself at the time of that appt. During a prior appt. with writer (12/13/24), pt. reported that in the time since her intake with  writer (11/11/24) she has continued to experience thoughts that she would be better off dead and of killing herself by crashing her car, but that she has not experienced plan or intent to kill herself. On intake, pt. indicated that she feels very confident that she would not act on thoughts of suicide as she recognizes the negative impact that her suicide would have on friends and family. Pt. indicated that she has never made a plan or an attempt to kill herself. On intake pt. also noted that approximately six months prior she experienced intent to kill herself, which she attributed to Lyrica that she was prescribed at the time. Pt. reported that she "locked myself in a room" to keep herself safe. Given pt.'s recent thoughts of suicide with methods, writer and pt. completed a safety plan together during pt.'s intake appt. See below for pt.'s safety plan. Pt. was engaged in and receptive to safety planning.   Safety plan Step 1. Warning signs Flashbacks about trauma Shaking and moving a lot more Intrusive thoughts of hurting myself  Step 2: Internal coping strategies Going for a walk in a not so populated place (e.g., beach) Going to sleep  Step 3: People that provide distraction Visiting my niece Calling my mom  Step 4: People whom I can ask for help: My cousin (Eliza)  Step 5: Professionals who I can contact during a crisis Palak Bailey, Psychologist [facility] [address]: [facility]  Step 6: Things I can do to make my environment safer N/A (not thinking of any specific methods to hurt myself that involve items at home)  Lastly, it is also of note that on intake pt. endorsed a history of non-suicidal self-injury, noting that she engaged in cutting behavior in her 20s. Pt. reported that she continues to bite herself "randomly" though she does not break skin when she engages in biting behavior.  Pt.'s heightened anxiety symptoms, history of trauma, family history of suicide, ongoing active suicidal ideation, and chronic pain are risk factors, though stable residence, engagement in treatment, sense of responsibility to friends and family, and willingness to safety plan are protective factors. Given these protective factors and that pt. was denying intent and plan to kill herself at the time of this appointment, pt. is deemed to be low acute risk for suicide but elevated chronic risk for suicide and self-injurious behavior. Taken together, hospitalization was not warranted at this time and risk will be assessed at f/u.   [FreeTextEntry7] : Pt. denied lifetime history homicidal ideation and aggressive behavior on intake. Pt. did not endorse homicidal ideation or aggression during this appt. Given this and pt.'s protective factors, described above, pt. is deemed to be low risk for homicide and aggression at this time. [Low acute suicide risk] : Low acute suicide risk [Yes] : Safety Plan completed/updated (for individuals at risk): Yes

## 2025-01-12 NOTE — REASON FOR VISIT
[Patient preference] : as per patient preference [Continuing, patient not seen in-person within last 12 months (provide details below)] : Telehealth services are continuing, patient not seen in-person within last 12 months.  [Telehealth (audio & video) - Individual/Group] : This visit was provided via telehealth using real-time 2-way audio visual technology. [Other Location: e.g. Home (Enter Location, City,State)___] : The provider was located at [unfilled]. [Home] : The patient, [unfilled], was located at home, [unfilled], at the time of the visit. [Patient's space is appropriate for telehealth and maintains privacy/confidentiality.] : Patient's space is appropriate for telehealth and maintains privacy/confidentiality. [Verbal consent obtained from patient/other participant(s)] : Verbal consent for telehealth/telephonic services obtained from patient/other participant(s) [FreeTextEntry4] : 10:00AM [FreeTextEntry5] : 10:53AM [Patient] : Patient [FreeTextEntry1] : PTSD symptoms

## 2025-01-15 NOTE — HISTORY OF PRESENT ILLNESS
[FreeTextEntry1] : The following service was provided using telehealth between writer/provider and patient. The patient was at home. The writer was at clinic. Patient was alone and consented to telehealth format. All treatment plans through and including today's date were reviewed with the patient.  Patient is here for 1 month ago follow-up visit via telehealth.  Last month Rexulti from 0.5 to 1 mg and increase Trintellix from 10 to 20 mg PO QD. Patient feels she si going through a lot right now she azul snot know if she feels the effect of the medication.    Mood: anxious and depressed.   Sleep: restarted Mirtazapine 7.5 mg and is getting partial effect from the medication and wants to increase it. Getting 4-5 hours per night.  Appetite: is increased Energy: decreased Concentration: decreased    Motivation: decreased Denies any AVH, SI or HI. Last marijuana: since Dec 2024  Nicotine: Smoking has decreased from 10 to 7 cigarettes per day. Not ready to quit. Patient states she started psychotherapy. Doing EMDR for her PTSD

## 2025-01-15 NOTE — PLAN
[FreeTextEntry4] : Assessment: Patient is a 43 yo female with h/o depression, anxiety, and PTSD seen today for medication management. Patient is compliant with the medications, tolerating it well without any side effects. I-STOP was checked without any problems.  I-STOP: Patient Demographic Information (PDI)     PDI	First Name	Last Name	Birth Date	Gender	Street Address	[facility]	Zip Code [place]	02/18/1979	Female	[address]	[address]  Prescription Information    PDI Filter:   PDI	My Rx	Current Rx	Drug Type	Rx Written	Rx Dispensed	Drug	Quantity	Days Supply	Prescriber Name	Prescriber XUAN #	Payment Method	Dispenser A	N	N	O	09/22/2023	09/22/2023	oxycodone-acetaminophen 5-325 mg tab	21	7	Caterina Holliday	[id number]	Insurance	[id number] A	N	N	O	08/24/2023	08/25/2023	oxycodone-acetaminophen 5-325 mg tab	21	7	Caterina Holliday	[id number]	Insurance	[id number] A	N	N	O	08/16/2023	08/16/2023	oxycodone-acetaminophen 5-325 mg tab	21	7	Caterina Holliday	[id number]	Insurance	[id number] A	N	N	O	07/27/2023	07/28/2023	oxycodone-acetaminophen 5-325 mg tab	21	7	Caterina Holliday	[id number]	Insurance	[id number] A	N	N	O	07/06/2023	07/06/2023	oxycodone-acetaminophen 5-325 mg tab	45	15	Deejay Sofia	[id number]	Insurance	[id number] A	N	N		07/03/2023	07/03/2023	pregabalin 50 mg capsule	60	30	Deejay Sofia	[id number]	Insurance	[id number] A	N	N	O	06/26/2023	06/26/2023	oxycodone-acetaminophen 5-325 mg tab	21	7	Diamond Sims	[id number]	Insurance	[id number] A	N	N	O	06/21/2023	06/22/2023	buprenorphine 10 mcg/hr patch	4	28	Andrez Tobar MD	[id number]	Insurance	[id number]  PLAN: Continue Rexulti 1 mg PO QD for mood disorder Continue Trintellix 20 mg PO QD for depression and anxiety Continue the Klonopin 0.75 mg PO PRN for anxiety #45 D/C Zoloft 75 D/C Pristiq PO QD for depression and anxiety D/C Hydroxyzine 10 mg PO QHS for anxiety and insomnia D/C Mirtazapine 7.5 mg PO QHS for insomnia D/C Cymbalta 120 mg PO QHS for depression, anxiety and neuropathic pain. D/C Vistaril 25 mg PO TID PRN for anxiety, insomnia and allergic reaction. D/C Zoloft 25 mg x 5 days and then increase it to 50 mg PO QD for depression and anxiety D/C Lexapro 10 mg PO QAM for depression and anxiety D/C Trazodone 50 mg PO QHS for insomnia - Discussed risks and benefits of medications including side effects of GI and sexual with SSRI. Alternative strategies including no intervention discussed with patient. Patient consents to current medications as prescribed. - Discussed with patient regarding importance of abstinence and sobriety from alcohol and drugs. Educated about relationship between worsening mood/anxiety symptoms and drug use and improvement of symptoms with abstinence. - Discussed about unpredictable effects including cardiorespiratory collapse from the combination of illicit drugs and prescribed medications. Patient verbalized understanding. - Patient understands to contact clinic prn with concerns and agrees to call 911 or go to nearest ER if symptoms worsen. - Next appointment was made by the patient in 1 months Patient was not in any distress.

## 2025-01-15 NOTE — PHYSICAL EXAM
[None] : none [Cooperative] : cooperative [Depressed] : depressed [Anxious] : anxious [Flat] : flat [Clear] : clear [Linear/Goal Directed] : linear/goal directed [Average] : average [WNL] : within normal limits [de-identified] : dysphoric

## 2025-02-05 NOTE — REASON FOR VISIT
[Patient preference] : as per patient preference [Access issues (e.g., transportation, impaired mobility, etc.)] : due to patient's access issues [Telehealth (audio & video) - Individual/Group] : This visit was provided via telehealth using real-time 2-way audio visual technology. [Medical Office: ([facility])___] : The provider was located at the medical office in [unfilled]. [Home] : The patient, [unfilled], was located at home, [unfilled], at the time of the visit. [Patient's space is appropriate for telehealth and maintains privacy/confidentiality.] : Patient's space is appropriate for telehealth and maintains privacy/confidentiality. [Participant(s) identity verified] : Participant(s) identity verified. [For new patient(s) – practitioner identifies themselves to participants] : Practitioner identifies themselves to participants. [Verbal consent obtained from patient/other participant(s)] : Verbal consent for telehealth/telephonic services obtained from patient/other participant(s) [FreeTextEntry4] : 2 pm [FreeTextEntry5] : 2:57 pm [Patient] : Patient [FreeTextEntry1] : Transferring to writer to continue trauma therapy.

## 2025-02-05 NOTE — PHYSICAL EXAM
[Average] : average [Cooperative] : cooperative [Depressed] : depressed [Anxious] : anxious [Full] : full [Clear] : clear [Linear/Goal Directed] : linear/goal directed [WNL] : within normal limits

## 2025-02-05 NOTE — DISCUSSION/SUMMARY
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 Pt reported applying for SSI for medical and psychiatric causes. Reports workplace encouraged her to go on leave in Sep 2024 due to daily panic attacks. Pt was engaged in trauma therapy with prior provider; had begun CPT. After much discussion, pt noted preference for PE moving forward.   Provisional diagnosis: PTSD; R/O Major Depressive Disorder

## 2025-02-05 NOTE — PLAN
[Integrative Therapy] : Integrative Therapy  [Psychoeducation] : Psychoeducation  [de-identified] : Met with pt to establish rapport and review treatment with prior provider. Pt reports symptoms of depression, anxiety and post-trauma; no SI/HI noted. Pt had started CPT and was learning about ABC worksheets and stuck points. Reviewed pt's interest and readiness to continue with CPT. Pt noted history of  difficulty reading; college reportedly diagnosed her with dyslexia, however, pt does not recall completing a neuropsychological evaluation. After much discussion and psychoeducation for CPT and PE, pt noted preference for PE. Open to switching trauma modalities. Discussed pt's  self-care practices. She benefits greatly from a stuffed animal that helps her breathe and provides relaxing music. Set goal to increase self-care activities, will review in next session and review diagnoses as well. Provided psychoeducation, validation and support.  [Recommended Frequency of Visits: ____] : Recommended frequency of visits: [unfilled] [Return in ____ week(s)] : Return in [unfilled] week(s)

## 2025-02-07 NOTE — PLAN
[FreeTextEntry2] : Writer and pt. have identified the following tx goals thus far: Problem 1: PTSD symptoms Goal 1: Reduce PTSD symptoms (hypervigilance, intrusive thoughts, avoidance) by 30%, as measured by PCL-5 Goal 2: Learn cognitive restructuring tools as a means to reduction of PTSD symptoms [Cognitive Processing Therapy] : Cognitive Processing Therapy  [de-identified] : Pt. reported that she did not complete ABC worksheets on assimilated stuck points. Pt. indicated that doing so felt too overwhelming. Writer and pt. therefore identified a therapy interfering stuck point that lead pt. to feel overwhelmed about the homework. Writer and pt. completed ABC Worksheet on that stuck point in session. Pt. was receptive to Socratic dialogue. Writer and pt. also reflected on this session as their last together, due to writer's upcoming maternity leave. Writer and pt. discussed transition plan of pt. transferring to another [facility] clinician to complete CPT. [Recommended Frequency of Visits: ____] : Recommended frequency of visits: [unfilled] [Return in ____ week(s)] : Return in [unfilled] week(s)

## 2025-02-07 NOTE — RISK ASSESSMENT
[FreeTextEntry8] : Pt. endorsed passive SI during this appt. Pt. indicated that she was not experiencing active SI (i.e.., thoughts of killing herself) during this appointment or in the past week.. During a prior appt. with writer (12/13/24), pt. reported that in the time since her intake with writer (11/11/24) she has continued to experience thoughts that she would be better off dead and of killing herself by crashing her car, but that she has not experienced plan or intent to kill herself. On intake, pt. indicated that she feels very confident that she would not act on thoughts of suicide as she recognizes the negative impact that her suicide would have on friends and family. Pt. indicated that she has never made a plan or an attempt to kill herself. On intake pt. also noted that approximately six months prior she experienced intent to kill herself, which she attributed to Lyrica that she was prescribed at the time. Pt. reported that she "locked myself in a room" to keep herself safe. Given pt.'s recent thoughts of suicide with methods, writer and pt. completed a safety plan together during pt.'s intake appt. See below for pt.'s safety plan. Pt. was engaged in and receptive to safety planning.   Safety plan Step 1. Warning signs Flashbacks about trauma Shaking and moving a lot more Intrusive thoughts of hurting myself  Step 2: Internal coping strategies Going for a walk in a not so populated place (e.g., beach) Going to sleep  Step 3: People that provide distraction Visiting my niece Calling my mom  Step 4: People whom I can ask for help: My cousin (Eliza)  Step 5: Professionals who I can contact during a crisis Palak Bailey, Psychologist [facility]: [facility]  Step 6: Things I can do to make my environment safer N/A (not thinking of any specific methods to hurt myself that involve items at home)  Lastly, it is also of note that on intake pt. endorsed a history of non-suicidal self-injury, noting that she engaged in cutting behavior in her 20s. Pt. reported that she continues to bite herself "randomly" though she does not break skin when she engages in biting behavior.  Pt.'s heightened anxiety symptoms, history of trauma, family history of suicide, ongoing active suicidal ideation, and chronic pain are risk factors, though stable residence, engagement in treatment, sense of responsibility to friends and family, and willingness to safety plan are protective factors. Given these protective factors and that pt. was denying intent and plan to kill herself at the time of this appointment, pt. is deemed to be low acute risk for suicide but elevated chronic risk for suicide and self-injurious behavior. Taken together, hospitalization was not warranted at this time and risk will be assessed at f/u.   [FreeTextEntry7] : Pt. denied lifetime history homicidal ideation and aggressive behavior on intake. Pt. did not endorse homicidal ideation or aggression during this appt. Given this and pt.'s protective factors, described above, pt. is deemed to be low risk for homicide and aggression at this time. [Low acute suicide risk] : Low acute suicide risk [Yes] : Safety Plan completed/updated (for individuals at risk): Yes

## 2025-02-07 NOTE — REASON FOR VISIT
[Patient preference] : as per patient preference [Continuing, patient not seen in-person within last 12 months (provide details below)] : Telehealth services are continuing, patient not seen in-person within last 12 months.  [Telehealth (audio & video) - Individual/Group] : This visit was provided via telehealth using real-time 2-way audio visual technology. [Other Location: e.g. Home (Enter Location, City,State)___] : The provider was located at [unfilled]. [Home] : The patient, [unfilled], was located at home, [unfilled], at the time of the visit. [Patient's space is appropriate for telehealth and maintains privacy/confidentiality.] : Patient's space is appropriate for telehealth and maintains privacy/confidentiality. [Verbal consent obtained from patient/other participant(s)] : Verbal consent for telehealth/telephonic services obtained from patient/other participant(s) [FreeTextEntry4] : 11:00AM [FreeTextEntry5] : 11:45AM [Patient] : Patient [FreeTextEntry1] : PTSD symptoms

## 2025-02-14 NOTE — PLAN
[FreeTextEntry4] : Assessment: Patient is a 43 yo female with h/o depression, anxiety, and PTSD seen today for medication management. Patient is compliant with the medications, tolerating it well without any side effects. I-STOP was checked without any problems.  I-STOP: Patient Demographic Information (PDI)     PDI	First Name	Last Name	Birth Date	Gender	Street Address	[facility]	Zip Code [place]	02/18/1979	Female	[address]	[address]  Prescription Information    PDI Filter:   PDI	My Rx	Current Rx	Drug Type	Rx Written	Rx Dispensed	Drug	Quantity	Days Supply	Prescriber Name	Prescriber XUAN #	Payment Method	Dispenser A	N	N	O	09/22/2023	09/22/2023	oxycodone-acetaminophen 5-325 mg tab	21	7	Caterina Holliday	[id number]	Insurance	[id number] A	N	N	O	08/24/2023	08/25/2023	oxycodone-acetaminophen 5-325 mg tab	21	7	Caterina Holliday	[id number]	Insurance	[id number] A	N	N	O	08/16/2023	08/16/2023	oxycodone-acetaminophen 5-325 mg tab	21	7	Caterina Holliday	[id number]	Insurance	[id number] A	N	N	O	07/27/2023	07/28/2023	oxycodone-acetaminophen 5-325 mg tab	21	7	Caterina Holliday	[id number]	Insurance	[id number] A	N	N	O	07/06/2023	07/06/2023	oxycodone-acetaminophen 5-325 mg tab	45	15	Deejay Sofia	[id number]	Insurance	[id number] A	N	N		07/03/2023	07/03/2023	pregabalin 50 mg capsule	60	30	Deejay Sofia	[id number]	Insurance	[id number] A	N	N	O	06/26/2023	06/26/2023	oxycodone-acetaminophen 5-325 mg tab	21	7	Diamond Sims	[id number]	Insurance	[id number] A	N	N	O	06/21/2023	06/22/2023	buprenorphine 10 mcg/hr patch	4	28	Andrez Tobar MD	[id number]	Insurance	[id number]  PLAN: Start Doxepin 10 mg PO QHS fro insomnia Increase Rexulti 1 to 2 mg PO QD for mood disorder Continue Trintellix 20 mg PO QD for depression and anxiety Continue Klonopin 0.75 mg PO PRN for anxiety #45 D/C Zoloft 75 D/C Pristiq PO QD for depression and anxiety D/C Hydroxyzine 10 mg PO QHS for anxiety and insomnia D/C Mirtazapine 7.5 mg PO QHS for insomnia D/C Cymbalta 120 mg PO QHS for depression, anxiety and neuropathic pain. D/C Vistaril 25 mg PO TID PRN for anxiety, insomnia and allergic reaction. D/C Zoloft 25 mg x 5 days and then increase it to 50 mg PO QD for depression and anxiety D/C Lexapro 10 mg PO QAM for depression and anxiety D/C Trazodone 50 mg PO QHS for insomnia - Discussed risks and benefits of medications including side effects of GI and sexual with SSRI. Alternative strategies including no intervention discussed with patient. Patient consents to current medications as prescribed. - Discussed with patient regarding importance of abstinence and sobriety from alcohol and drugs. Educated about relationship between worsening mood/anxiety symptoms and drug use and improvement of symptoms with abstinence. - Discussed about unpredictable effects including cardiorespiratory collapse from the combination of illicit drugs and prescribed medications. Patient verbalized understanding. - Patient understands to contact clinic prn with concerns and agrees to call 911 or go to nearest ER if symptoms worsen. - Next appointment was made by the patient in 1 month Patient was not in any distress.

## 2025-02-14 NOTE — PHYSICAL EXAM
[None] : none [Cooperative] : cooperative [Depressed] : depressed [Anxious] : anxious [Flat] : flat [Clear] : clear [Linear/Goal Directed] : linear/goal directed [Average] : average [WNL] : within normal limits [de-identified] : dysphoric

## 2025-02-14 NOTE — HISTORY OF PRESENT ILLNESS
[FreeTextEntry1] : The following service was provided using telehealth between writer/provider and patient. The patient was at home. The writer was at clinic. Patient was alone and consented to telehealth format. All treatment plans through and including today's date were reviewed with the patient.  Patient is here for 1 month ago follow-up visit via telehealth.  No medication change sat that time. States she had foot surgery, so she is dealing with that. States she is doing well. Wanted to increase the Rexulti from 1 to 2 mg. Taking Percocet for pain.     Mood: anxious and depressed.   Sleep: restarted Mirtazapine but states she developed a rash from it.  Getting 4-5 hours of sleep.  Appetite: is decreased Energy: decreased Concentration: decreased    Motivation: decreased Denies any AVH, SI or HI. Last marijuana: since Dec 2024  Nicotine: Smoking has decreased from 10 to 7 cigarettes per day. Not ready to quit. Patient states she started psychotherapy. Doing EMDR for her PTSD

## 2025-02-25 NOTE — DISCUSSION/SUMMARY
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 She has an amicable relationship with her . Pt reported applying for SSI for medical and psychiatric causes. Reports workplace encouraged her to go on leave in Sep 2024 due to daily panic attacks. Pt was engaged in trauma therapy with prior provider; had begun CPT. Given various medical issues, as of 2/25/25, pt prefers to hold off on trauma therapy until physical health is stabilized. Agreed to re-engage treatment at the end of April.  Diagnosis: PTSD; Major Depressive Disorder

## 2025-02-25 NOTE — PLAN
[Integrative Therapy] : Integrative Therapy  [Psychoeducation] : Psychoeducation  [Recommended Frequency of Visits: ____] : Recommended frequency of visits: [unfilled] [Return in ____ week(s)] : Return in [unfilled] week(s) [de-identified] : Met with pt to continue establishing rapport and assess her readiness for trauma therapy. Pt reports experiencing a panic attack this morning in relation to starting trauma therapy. Noted that she's still healing from her foot surgery; waiting for a follow-up with the surgeon in early March. She then has two more surgeries scheduled for late March and early April. Pt also reports vomiting most mornings as of late; going for GI consult later today. Noted history of difficulty eating in the mornings and having a low appetite. Assessed depressive and anxious symptoms. Noted difficulty feeling fully comfortable and safe in the home for trauma therapy, prefers to wait until she can come in person or meet from her vehicle. Pt does have consistent supportive therapy on a weekly basis, feels supported by family, and denies SI/HI. She prefers to wait until she's healed from her surgeries to focus on trauma therapy. Reviewed treatment options and pt is most interested in EMDR. Agreed to reconnect in late April and assess post-trauma symptoms at that point and if pt is sitll interested, engage in EMDR then. Pt is aware of how to connect with writer between now and then if needed. [FreeTextEntry1] : Problem: Elevated post-trauma symptoms Goal: Engage in trauma therapy in the spring to reduce PTSD symptoms by at least 30%

## 2025-02-25 NOTE — REASON FOR VISIT
[Patient preference] : as per patient preference [Access issues (e.g., transportation, impaired mobility, etc.)] : due to patient's access issues [Telehealth (audio & video) - Individual/Group] : This visit was provided via telehealth using real-time 2-way audio visual technology. [Medical Office: ([facility])___] : The provider was located at the medical office in [unfilled]. [Home] : The patient, [unfilled], was located at home, [unfilled], at the time of the visit. [Patient's space is appropriate for telehealth and maintains privacy/confidentiality.] : Patient's space is appropriate for telehealth and maintains privacy/confidentiality. [Participant(s) identity verified] : Participant(s) identity verified. [For new patient(s) – practitioner identifies themselves to participants] : Practitioner identifies themselves to participants. [Verbal consent obtained from patient/other participant(s)] : Verbal consent for telehealth/telephonic services obtained from patient/other participant(s) [Patient] : Patient [FreeTextEntry4] : 12:03 pm [FreeTextEntry5] : 12:58 pm [FreeTextEntry1] : Assess pt's readiness for trauma therapy at this time.

## 2025-03-19 NOTE — HISTORY OF PRESENT ILLNESS
[FreeTextEntry1] : The following service was provided using telehealth between writer/provider and patient. The patient was at home. The writer was at clinic. Patient was alone and consented to telehealth format. All treatment plans through and including today's date were reviewed with the patient.  Patient is here for 1 month ago follow-up visit via telehealth.  At that time Rexulti was increased from 1 to 2 mg and Doxepin 10 mg was started on the patient. States the Doxepin was too strong for her. Rexulti increase helped her mood. States she has been having panic episodes. Has not used the Klonopin as she had foot surgery and wanted to use her Percocet.   Mood: anxious and depressed.   Sleep: decreased.  Getting 4-5 hours of sleep.  Appetite: is decreased. She is throwing up green liquid. Has to go see her GI doctor.  Energy: decreased Concentration: decreased    Motivation: decreased Denies any AVH, SI or HI. Last marijuana: increased use. Last use was yesterday.  Nicotine: Smoking has decreased from 10 to 7 cigarettes per day. Not ready to quit. Holding off on EMDR until all these surgeries are done.

## 2025-03-19 NOTE — PHYSICAL EXAM
[None] : none [Cooperative] : cooperative [Depressed] : depressed [Anxious] : anxious [Flat] : flat [Clear] : clear [Linear/Goal Directed] : linear/goal directed [Average] : average [WNL] : within normal limits [de-identified] : dysphoric